# Patient Record
Sex: FEMALE | Race: WHITE | NOT HISPANIC OR LATINO
[De-identification: names, ages, dates, MRNs, and addresses within clinical notes are randomized per-mention and may not be internally consistent; named-entity substitution may affect disease eponyms.]

---

## 2022-07-13 ENCOUNTER — APPOINTMENT (OUTPATIENT)
Dept: CARDIOLOGY | Facility: CLINIC | Age: 67
End: 2022-07-13

## 2022-07-13 ENCOUNTER — NON-APPOINTMENT (OUTPATIENT)
Age: 67
End: 2022-07-13

## 2022-07-13 VITALS
SYSTOLIC BLOOD PRESSURE: 162 MMHG | DIASTOLIC BLOOD PRESSURE: 80 MMHG | HEART RATE: 77 BPM | OXYGEN SATURATION: 99 % | WEIGHT: 127 LBS | BODY MASS INDEX: 21.46 KG/M2

## 2022-07-13 VITALS — DIASTOLIC BLOOD PRESSURE: 80 MMHG | SYSTOLIC BLOOD PRESSURE: 145 MMHG | HEART RATE: 70 BPM

## 2022-07-13 DIAGNOSIS — I10 ESSENTIAL (PRIMARY) HYPERTENSION: ICD-10-CM

## 2022-07-13 PROCEDURE — 99204 OFFICE O/P NEW MOD 45 MIN: CPT

## 2022-07-13 PROCEDURE — 93000 ELECTROCARDIOGRAM COMPLETE: CPT

## 2022-07-13 RX ORDER — ATORVASTATIN CALCIUM 10 MG/1
10 TABLET, FILM COATED ORAL
Qty: 90 | Refills: 0 | Status: ACTIVE | COMMUNITY
Start: 2022-06-10

## 2022-07-13 NOTE — DISCUSSION/SUMMARY
[FreeTextEntry1] :  1.  2D echo to evaluate LV and RV function look for left ventricular hypertrophy\par \par  2.  Exercise stress test to evaluate her blood pressure response to exercise\par \par  3.  At this point I would not start an antihypertensive.  I had a long talk with her about being much more careful with salt in her diet particularly when she eats out and to look at the packages and the salt content of the foods she has at home.  No canned foods, saty the chips which most of them are,  soups etc.\par \par  she will follow-up with me after the echo and the exercise stress test.  At that point we will consider whether to add some gentle medication. [EKG obtained to assist in diagnosis and management of assessed problem(s)] : EKG obtained to assist in diagnosis and management of assessed problem(s)

## 2022-07-13 NOTE — HISTORY OF PRESENT ILLNESS
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  This has been extensively evaluated by nephrology in 2013.\par \par   She has a history of mixed hyperlipidemia on a statin.  She has had no significant hospitalizations except for an oophorectomy.\par \par   She exercises on a regular basis and plays tennis frequently without symptoms of chest pain chest pressure shortness of breath.  She has occasional nonexertional left-sided chest discomfort.  \par \par  she recently has been noted to have hypertension.  However when she takes her blood pressure at home it usually relatively normal.\par Carefully questioning her she is not that careful with salt in her diet and she does eat out not infrequently.  The other night she had soup in a diner.\par \par  She otherwise is in excellent shape with an excellent weight.\par \par   EKG July 13, 2022 normal sinus rhythm within normal limits

## 2022-07-13 NOTE — ASSESSMENT
[FreeTextEntry1] : Prema Nava  has mild hypertension with some degree of whitecoat hypertension.  She is asymptomatic with otherwise normal physical exam normal EKG.  She has excellent exercise tolerance with occasional atypical nonexertional pain.  She is not overly careful with salt in her diet and she does claim her blood pressure at home is controlled

## 2022-07-13 NOTE — PHYSICAL EXAM
[Well Developed] : well developed [Well Nourished] : well nourished [No Acute Distress] : no acute distress [Normal Conjunctiva] : normal conjunctiva [Normal Venous Pressure] : normal venous pressure [Normal S1, S2] : normal S1, S2 [No Murmur] : no murmur [Clear Lung Fields] : clear lung fields [Non Tender] : non-tender [Normal Gait] : normal gait [No Edema] : no edema [Moves all extremities] : moves all extremities [Alert and Oriented] : alert and oriented [de-identified] :  blood pressure initially 160/80, later 142/80

## 2022-08-17 ENCOUNTER — APPOINTMENT (OUTPATIENT)
Dept: CARDIOLOGY | Facility: CLINIC | Age: 67
End: 2022-08-17

## 2022-08-17 ENCOUNTER — TRANSCRIPTION ENCOUNTER (OUTPATIENT)
Age: 67
End: 2022-08-17

## 2022-08-17 PROCEDURE — 93015 CV STRESS TEST SUPVJ I&R: CPT

## 2022-08-17 PROCEDURE — 93306 TTE W/DOPPLER COMPLETE: CPT
